# Patient Record
Sex: FEMALE
[De-identification: names, ages, dates, MRNs, and addresses within clinical notes are randomized per-mention and may not be internally consistent; named-entity substitution may affect disease eponyms.]

---

## 2024-04-22 PROBLEM — Z00.00 ENCOUNTER FOR PREVENTIVE HEALTH EXAMINATION: Status: ACTIVE | Noted: 2024-04-22

## 2024-04-30 ENCOUNTER — APPOINTMENT (OUTPATIENT)
Dept: SURGICAL ONCOLOGY | Facility: CLINIC | Age: 59
End: 2024-04-30
Payer: COMMERCIAL

## 2024-04-30 ENCOUNTER — NON-APPOINTMENT (OUTPATIENT)
Age: 59
End: 2024-04-30

## 2024-04-30 VITALS
BODY MASS INDEX: 29.27 KG/M2 | HEART RATE: 60 BPM | WEIGHT: 155 LBS | SYSTOLIC BLOOD PRESSURE: 130 MMHG | DIASTOLIC BLOOD PRESSURE: 80 MMHG | HEIGHT: 61 IN | OXYGEN SATURATION: 99 %

## 2024-04-30 DIAGNOSIS — N64.4 MASTODYNIA: ICD-10-CM

## 2024-04-30 PROCEDURE — 99203 OFFICE O/P NEW LOW 30 MIN: CPT

## 2024-04-30 NOTE — ASSESSMENT
[FreeTextEntry1] : IMP: Sadaf is a 58 y.o F with complaints of b/l breast pain  PLAN: return for yearly imaging 12/2024 return PRN  All medical entries were at my, Dr. Humberto Cannon, direction. I have reviewed the chart and agree that the record accurately reflects my personal performance of the history, physical exam, assessment and plan. Our office Nurse Practitioner was present of the duration of the office visit.

## 2024-04-30 NOTE — PHYSICAL EXAM
[FreeTextEntry1] : SC present for exam  [de-identified] : Normal S1,S2. Regular rate and rhythm [de-identified] : Complete breast exam performed in supine and upright position. No palpable masses, tenderness, nipple discharge, inversion, deviation or enlarged axillary or supraclavicular lymph nodes bilaterally. [de-identified] : Clear breath sounds bilaterally with normal respiratory effort. Yes

## 2024-04-30 NOTE — HISTORY OF PRESENT ILLNESS
[de-identified] : Ms. Sadfa Bush is a 58 year old female who presents for an initial consultation for breast pain, referred by ..  Screening [facility] on 12/20/2023 shows no evidence of malignancy. BI-RADS 1 B/L breast US on 3/14/2024 is negative. BI-RADS 1  Complains of b/l breast pain since January. She drinks 6 cups a tea per day. She has taken Vit E capsules for 4 years.